# Patient Record
Sex: MALE | Race: WHITE | NOT HISPANIC OR LATINO | Employment: FULL TIME | URBAN - METROPOLITAN AREA
[De-identification: names, ages, dates, MRNs, and addresses within clinical notes are randomized per-mention and may not be internally consistent; named-entity substitution may affect disease eponyms.]

---

## 2018-05-17 ENCOUNTER — TELEPHONE (OUTPATIENT)
Dept: INTERNAL MEDICINE | Age: 33
End: 2018-05-17

## 2018-05-17 ENCOUNTER — OFFICE VISIT (OUTPATIENT)
Dept: INTERNAL MEDICINE | Age: 33
End: 2018-05-17

## 2018-05-17 VITALS
HEIGHT: 73 IN | WEIGHT: 183.4 LBS | TEMPERATURE: 97.7 F | SYSTOLIC BLOOD PRESSURE: 128 MMHG | HEART RATE: 62 BPM | DIASTOLIC BLOOD PRESSURE: 68 MMHG | BODY MASS INDEX: 24.31 KG/M2 | OXYGEN SATURATION: 99 %

## 2018-05-17 DIAGNOSIS — F41.0 PANIC ATTACK: ICD-10-CM

## 2018-05-17 DIAGNOSIS — Z23 ENCOUNTER FOR IMMUNIZATION: ICD-10-CM

## 2018-05-17 DIAGNOSIS — Z00.00 ROUTINE HEALTH MAINTENANCE: Primary | ICD-10-CM

## 2018-05-17 PROBLEM — Q23.1 AORTIC VALVE, BICUSPID: Status: ACTIVE | Noted: 2018-05-17

## 2018-05-17 PROBLEM — E55.9 AVITAMINOSIS D: Status: ACTIVE | Noted: 2018-05-17

## 2018-05-17 LAB
ALBUMIN SERPL-MCNC: 4.6 G/DL (ref 3.5–5.2)
ALBUMIN/GLOB SERPL: 1.8 G/DL
ALP SERPL-CCNC: 72 U/L (ref 39–117)
ALT SERPL-CCNC: 21 U/L (ref 1–41)
APPEARANCE UR: CLEAR
AST SERPL-CCNC: 17 U/L (ref 1–40)
BASOPHILS # BLD AUTO: 0.02 10*3/MM3 (ref 0–0.2)
BASOPHILS NFR BLD AUTO: 0.3 % (ref 0–1.5)
BILIRUB SERPL-MCNC: 0.5 MG/DL (ref 0.1–1.2)
BILIRUB UR QL STRIP: NEGATIVE
BUN SERPL-MCNC: 12 MG/DL (ref 6–20)
BUN/CREAT SERPL: 16.7 (ref 7–25)
CALCIUM SERPL-MCNC: 9.1 MG/DL (ref 8.6–10.5)
CHLORIDE SERPL-SCNC: 102 MMOL/L (ref 98–107)
CHOLEST SERPL-MCNC: 167 MG/DL (ref 0–200)
CO2 SERPL-SCNC: 28.6 MMOL/L (ref 22–29)
COLOR UR: YELLOW
CREAT SERPL-MCNC: 0.72 MG/DL (ref 0.76–1.27)
EOSINOPHIL # BLD AUTO: 0.2 10*3/MM3 (ref 0–0.7)
EOSINOPHIL NFR BLD AUTO: 3.2 % (ref 0.3–6.2)
ERYTHROCYTE [DISTWIDTH] IN BLOOD BY AUTOMATED COUNT: 12.4 % (ref 11.5–14.5)
GFR SERPLBLD CREATININE-BSD FMLA CKD-EPI: 124 ML/MIN/1.73
GFR SERPLBLD CREATININE-BSD FMLA CKD-EPI: >150 ML/MIN/1.73
GLOBULIN SER CALC-MCNC: 2.5 GM/DL
GLUCOSE SERPL-MCNC: 90 MG/DL (ref 65–99)
GLUCOSE UR QL: NEGATIVE
HCT VFR BLD AUTO: 45.6 % (ref 40.4–52.2)
HDLC SERPL-MCNC: 46 MG/DL (ref 40–60)
HGB BLD-MCNC: 14.9 G/DL (ref 13.7–17.6)
HGB UR QL STRIP: NEGATIVE
IMM GRANULOCYTES # BLD: 0 10*3/MM3 (ref 0–0.03)
IMM GRANULOCYTES NFR BLD: 0 % (ref 0–0.5)
KETONES UR QL STRIP: NEGATIVE
LDLC SERPL CALC-MCNC: 106 MG/DL (ref 0–100)
LEUKOCYTE ESTERASE UR QL STRIP: NEGATIVE
LYMPHOCYTES # BLD AUTO: 1.95 10*3/MM3 (ref 0.9–4.8)
LYMPHOCYTES NFR BLD AUTO: 31.5 % (ref 19.6–45.3)
MCH RBC QN AUTO: 31.2 PG (ref 27–32.7)
MCHC RBC AUTO-ENTMCNC: 32.7 G/DL (ref 32.6–36.4)
MCV RBC AUTO: 95.6 FL (ref 79.8–96.2)
MONOCYTES # BLD AUTO: 0.5 10*3/MM3 (ref 0.2–1.2)
MONOCYTES NFR BLD AUTO: 8.1 % (ref 5–12)
NEUTROPHILS # BLD AUTO: 3.53 10*3/MM3 (ref 1.9–8.1)
NEUTROPHILS NFR BLD AUTO: 56.9 % (ref 42.7–76)
NITRITE UR QL STRIP: NEGATIVE
PH UR STRIP: 8 [PH] (ref 5–8)
PLATELET # BLD AUTO: 247 10*3/MM3 (ref 140–500)
POTASSIUM SERPL-SCNC: 4.8 MMOL/L (ref 3.5–5.2)
PROT SERPL-MCNC: 7.1 G/DL (ref 6–8.5)
PROT UR QL STRIP: NEGATIVE
RBC # BLD AUTO: 4.77 10*6/MM3 (ref 4.6–6)
SODIUM SERPL-SCNC: 142 MMOL/L (ref 136–145)
SP GR UR: 1.02 (ref 1–1.03)
T4 FREE SERPL-MCNC: 1.31 NG/DL (ref 0.93–1.7)
TRIGL SERPL-MCNC: 77 MG/DL (ref 0–150)
TSH SERPL DL<=0.005 MIU/L-ACNC: 2.16 MIU/ML (ref 0.27–4.2)
UROBILINOGEN UR STRIP-MCNC: NORMAL MG/DL
VLDLC SERPL CALC-MCNC: 15.4 MG/DL (ref 5–40)
WBC # BLD AUTO: 6.2 10*3/MM3 (ref 4.5–10.7)

## 2018-05-17 PROCEDURE — 99395 PREV VISIT EST AGE 18-39: CPT | Performed by: INTERNAL MEDICINE

## 2018-05-17 PROCEDURE — 90715 TDAP VACCINE 7 YRS/> IM: CPT | Performed by: INTERNAL MEDICINE

## 2018-05-17 PROCEDURE — 90471 IMMUNIZATION ADMIN: CPT | Performed by: INTERNAL MEDICINE

## 2018-05-17 RX ORDER — CHLORAL HYDRATE 500 MG
1000 CAPSULE ORAL
COMMUNITY

## 2018-05-17 RX ORDER — MELATONIN
1000 DAILY
COMMUNITY

## 2018-05-17 RX ORDER — LANOLIN ALCOHOL/MO/W.PET/CERES
1000 CREAM (GRAM) TOPICAL DAILY
COMMUNITY

## 2018-05-17 RX ORDER — LANOLIN ALCOHOL/MO/W.PET/CERES
50 CREAM (GRAM) TOPICAL DAILY
COMMUNITY

## 2018-05-17 RX ORDER — ESCITALOPRAM OXALATE 10 MG/1
10 TABLET ORAL DAILY
Qty: 30 TABLET | Refills: 1 | Status: SHIPPED | OUTPATIENT
Start: 2018-05-17

## 2018-05-17 RX ORDER — ESCITALOPRAM OXALATE 10 MG/1
10 TABLET ORAL DAILY
Qty: 30 TABLET | Refills: 1 | Status: SHIPPED | OUTPATIENT
Start: 2018-05-17 | End: 2018-05-17 | Stop reason: SDUPTHER

## 2018-05-17 NOTE — TELEPHONE ENCOUNTER
Can you please call this patient and tell him that his prescription from today was sent to the pharmacy had originally requested [facility] and he will need to call [facility] to have it transferred.   Thank you very much

## 2018-05-17 NOTE — PROGRESS NOTES
"[facility] [place] INTERNAL MEDICINE        Ramakrishna JONES Noah / 33 y.o. / male  05/17/2018      ASSESSMENT & PLAN:    Problem List Items Addressed This Visit     None      Visit Diagnoses     Routine health maintenance    -  Primary    Relevant Orders    CBC & Differential    Comprehensive Metabolic Panel    Lipid Panel    Urinalysis With / Microscopic If Indicated - Urine, Clean Catch    TSH    T4, free    Panic attack        Relevant Medications    escitalopram (LEXAPRO) 10 MG tablet    Encounter for immunization        Relevant Orders    Tdap Vaccine Greater Than or Equal To 8yo IM        Orders Placed This Encounter   Procedures   • Tdap Vaccine Greater Than or Equal To 8yo IM   • Comprehensive Metabolic Panel   • Lipid Panel   • Urinalysis With / Microscopic If Indicated - Urine, Clean Catch   • TSH   • T4, free   • CBC & Differential       Summary/Discussion:    Number-one routine health maintenance, clinically stable, see comments above and below.  Lab as above follow-up results online.    #2 immunization update, Adacel today.    #3 episodes of feeling warm, lightheaded, sweating and nausea during potentially stressful events.  This is a new complaint for the patient.  I see no abnormalities on physical exam, we'll evaluate lab, however at this point I think this most likely represents a panic attack.  Recommend Lexapro 10 mg per day.  Ramandeep usage, and side effect profile discussed with the patient.  He is willing to try this at this time.  I hesitate to use when necessary beta blocker for fear of causing hypotension and further problems.  We'll see him back in approximately 6 weeks.      Return in about 6 weeks (around 6/28/2018) for Recheck.    ____________________________________________________________________    VITALS    Visit Vitals  /68   Pulse 62   Temp 97.7 °F (36.5 °C)   Ht 185.4 cm (72.99\")   Wt 83.2 kg (183 lb 6.4 oz)   SpO2 99%   BMI 24.20 kg/m²       BP Readings from Last 3 Encounters:   05/17/18 128/68 "   07/24/14 114/82   04/24/13 100/69     Wt Readings from Last 3 Encounters:   05/17/18 83.2 kg (183 lb 6.4 oz)   07/24/14 81.2 kg (178 lb 15.9 oz)   04/24/13 77.1 kg (170 lb)      Body mass index is 24.2 kg/m².    CC:  Main reason(s) for today's visit: Establish Care; Dizziness (x 2 months ); Excessive Sweating (x 2 months ); and Recurrent Skin Infections (on right foot & toes )      HPI:     Patient last seen by me approximately 3 years ago and presents today with the above complaints.    Over the past several months he's noted new onset of feeling lightheaded, hot, sweaty prior to teaching or preaching.  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  He denied any palpitations or circumoral paresthesias.  He has experimented with eating more or less prior to these events to see if that played a role but it did not.  He denied use of any other supplements, and no recreational drug use is noted.  Exercise roughly twice a week with some walking and calisthenics.  No organized vigorous exercise.  His wife is concerned that this may represent stress because of the time constraints his current situation in life places on him.    Patient Care Team:  Robb Aguirre MD as PCP - General (Internal Medicine)  ____________________________________________________________________    REVIEW OF SYSTEMS    Review of Systems   Constitutional: Negative.    HENT: Negative.    Eyes: Negative.    Respiratory: Negative.    Cardiovascular: Negative.    Gastrointestinal: Positive for nausea.        She does have occasional nausea accompanying the episodes of lightheadedness and sweating.   Endocrine: Negative.    Genitourinary: Negative.    Musculoskeletal: Negative.    Skin: Negative.    Allergic/Immunologic: Negative for immunocompromised state.   Neurological: Negative.    Hematological: Negative.    Psychiatric/Behavioral: Negative.            PHYSICAL EXAMINATION    Physical Exam    Constitutional: He is oriented to person, place, and time. He appears well-developed and well-nourished. No distress.   HENT:   Head: Normocephalic and atraumatic.   Right Ear: Tympanic membrane, external ear and ear canal normal.   Left Ear: Tympanic membrane, external ear and ear canal normal.   Mouth/Throat: Uvula is midline, oropharynx is clear and moist and mucous membranes are normal.   Eyes: Conjunctivae and EOM are normal. Pupils are equal, round, and reactive to light.   Neck: Normal range of motion. Neck supple. No JVD present. Carotid bruit is not present. No thyromegaly present.   Cardiovascular: Normal rate, regular rhythm, normal heart sounds and intact distal pulses.  Exam reveals no gallop and no friction rub.    No murmur heard.  Pulmonary/Chest: Effort normal and breath sounds normal. He has no wheezes. He has no rales.   Abdominal: Soft. Bowel sounds are normal. There is no hepatosplenomegaly. There is no tenderness.   Genitourinary:   Genitourinary Comments: Defer   Musculoskeletal: Normal range of motion. He exhibits no edema or deformity.   Lymphadenopathy:     He has no cervical adenopathy.   Neurological: He is alert and oriented to person, place, and time. He has normal strength and normal reflexes. No cranial nerve deficit or sensory deficit. Coordination normal.   Skin: Skin is warm and dry. No rash noted.   Psychiatric: He has a normal mood and affect. His speech is normal and behavior is normal. Judgment and thought content normal. Cognition and memory are normal.   Nursing note and vitals reviewed.          REVIEWED DATA:    Labs:     No results found for: NA, K, AST, ALT, BUN, CREATININE, EGFRIFNONA, EGFRIFAFRI    No results found for: HGBA1C, GLUCOSE, MICROALBUR    No results found for: LDL, HDL, TRIG, CHOLHDLRATIO    No results found for: TSH, FREET4    No results found for: WBC, HGB, PLT    Imaging:         Medical Tests:         Summary of old records / correspondence /  consultant report:         Request outside records:         ALLERGIES  No Known Allergies     MEDICATIONS  Current Outpatient Prescriptions   Medication Sig Dispense Refill   • cholecalciferol (VITAMIN D3) 1000 units tablet Take 1,000 Units by mouth Daily.     • Omega-3 Fatty Acids (FISH OIL) 1000 MG capsule capsule Take 1,000 mg by mouth Daily With Breakfast.     • vitamin B-12 (CYANOCOBALAMIN) 1000 MCG tablet Take 1,000 mcg by mouth Daily.     • vitamin B-6 (PYRIDOXINE) 50 MG tablet Take 50 mg by mouth Daily.     • escitalopram (LEXAPRO) 10 MG tablet Take 1 tablet by mouth Daily. 30 tablet 1     No current facility-administered medications for this visit.        PFSH:     The following portions of the patient's history were reviewed and updated as appropriate: Allergies / Current Medications / Past Medical History / Surgical History / Social History / Family History    PROBLEM LIST   Patient Active Problem List   Diagnosis   • Aortic valve, bicuspid   • Avitaminosis D       PAST MEDICAL HISTORY  Past Medical History:   Diagnosis Date   • Bicuspid aortic valve    • Celiac disease        SURGICAL HISTORY  Past Surgical History:   Procedure Laterality Date   • SPINE SURGERY  2015    Discectomy       SOCIAL HISTORY  Social History     Social History   • Marital status:    • Number of children: 4     Occupational History   • Prof. and       Social History Main Topics   • Smoking status: Never Smoker   • Alcohol use No   • Drug use: No   • Sexual activity: Yes     Partners: Female     Other Topics Concern   • Not on file       FAMILY HISTORY  Family History   Problem Relation Age of Onset   • Other Mother    • Arthritis Mother    • Other Sister          **Dragon Disclaimer:   Much of this encounter note is an electronic transcription/translation of spoken language to printed text. The electronic translation of spoken language may permit erroneous, or at times, nonsensical words or phrases to be  inadvertently transcribed. Although I have reviewed the note for such errors, some may still exist.

## 2018-05-17 NOTE — TELEPHONE ENCOUNTER
Pt is requesting a NEW rx for Lexapro 10mg.    [facility] [place] #[phone].